# Patient Record
Sex: FEMALE | Race: WHITE | URBAN - METROPOLITAN AREA
[De-identification: names, ages, dates, MRNs, and addresses within clinical notes are randomized per-mention and may not be internally consistent; named-entity substitution may affect disease eponyms.]

---

## 2018-12-20 ENCOUNTER — OFFICE VISIT (OUTPATIENT)
Dept: URBAN - METROPOLITAN AREA CLINIC 17 | Facility: CLINIC | Age: 73
End: 2018-12-20
Payer: MEDICARE

## 2018-12-20 DIAGNOSIS — Z96.1 PRESENCE OF INTRAOCULAR LENS: ICD-10-CM

## 2018-12-20 DIAGNOSIS — H35.3131 NONEXUDATIVE AGE-RELATED MACULAR DEGENERATION, BILATERAL, EARLY DRY STAGE: Primary | ICD-10-CM

## 2018-12-20 PROCEDURE — 92014 COMPRE OPH EXAM EST PT 1/>: CPT | Performed by: OPTOMETRIST

## 2018-12-20 NOTE — IMPRESSION/PLAN
Impression: Non-exudative age-related macular degeneration, bilateral, early dry stage: H35.3131. Plan: Discussed diagnosis and treatment options with patient. Use of vitamins has shown to improve the effects of ARMD.  Recommend pt to take OTC PreserVision AREDS2. Will continue to monitor.

## 2019-05-06 ENCOUNTER — OFFICE VISIT (OUTPATIENT)
Dept: URBAN - METROPOLITAN AREA CLINIC 17 | Facility: CLINIC | Age: 74
End: 2019-05-06
Payer: MEDICARE

## 2019-05-06 DIAGNOSIS — H43.813 VITREOUS DEGENERATION, BILATERAL: ICD-10-CM

## 2019-05-06 DIAGNOSIS — H11.153 PINGUECULA, BILATERAL: ICD-10-CM

## 2019-05-06 DIAGNOSIS — H04.123 DRY EYE SYNDROME OF BILATERAL LACRIMAL GLANDS: ICD-10-CM

## 2019-05-06 DIAGNOSIS — H26.493 OTHER SECONDARY CATARACT, BILATERAL: ICD-10-CM

## 2019-05-06 PROCEDURE — 99214 OFFICE O/P EST MOD 30 MIN: CPT | Performed by: OPTOMETRIST

## 2019-05-06 ASSESSMENT — INTRAOCULAR PRESSURE
OS: 12
OD: 13

## 2019-05-06 NOTE — IMPRESSION/PLAN
Impression: Other secondary cataract, bilateral: H26.493. Plan: Discussed diagnosis in detail with patient. No treatment is required at this time. Will continue to observe condition and or symptoms. Call if [address] worsens.

## 2019-05-06 NOTE — IMPRESSION/PLAN
Impression: Dry eye syndrome of bilateral lacrimal glands: H04.123. Plan: Dry eyes account for the patient's complaints. There is no evidence of permanent changes to the cornea. Explained condition does not have a cure and will need artificial tears for maintenance. Patient ok to continue to Refresh drops.

## 2019-05-06 NOTE — IMPRESSION/PLAN
Impression: Pinguecula, bilateral: H11.153. Plan: Will continue to observe condition and or symptoms. No treatment is required at this time.